# Patient Record
Sex: FEMALE
[De-identification: names, ages, dates, MRNs, and addresses within clinical notes are randomized per-mention and may not be internally consistent; named-entity substitution may affect disease eponyms.]

---

## 2020-02-17 ENCOUNTER — HOSPITAL ENCOUNTER (OUTPATIENT)
Dept: HOSPITAL 90 - OIH | Age: 75
Discharge: HOME | End: 2020-02-17
Attending: INTERNAL MEDICINE
Payer: COMMERCIAL

## 2020-02-17 DIAGNOSIS — I10: Primary | ICD-10-CM

## 2020-02-17 PROCEDURE — 71045 X-RAY EXAM CHEST 1 VIEW: CPT

## 2022-06-27 ENCOUNTER — HOSPITAL ENCOUNTER (OUTPATIENT)
Dept: HOSPITAL 90 - RAH | Age: 77
Discharge: HOME | End: 2022-06-27
Attending: INTERNAL MEDICINE
Payer: COMMERCIAL

## 2022-06-27 DIAGNOSIS — S52.502A: Primary | ICD-10-CM
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
## 2022-06-27 PROCEDURE — 73100 X-RAY EXAM OF WRIST: CPT

## 2022-12-07 ENCOUNTER — HOSPITAL ENCOUNTER (OUTPATIENT)
Dept: HOSPITAL 90 - RAH | Age: 77
Discharge: HOME | End: 2022-12-07
Attending: INTERNAL MEDICINE
Payer: COMMERCIAL

## 2022-12-07 DIAGNOSIS — R13.10: ICD-10-CM

## 2022-12-07 DIAGNOSIS — J69.0: ICD-10-CM

## 2022-12-07 DIAGNOSIS — R13.12: Primary | ICD-10-CM

## 2022-12-07 PROCEDURE — 74230 X-RAY XM SWLNG FUNCJ C+: CPT

## 2022-12-07 PROCEDURE — 92611 MOTION FLUOROSCOPY/SWALLOW: CPT

## 2024-12-02 ENCOUNTER — HOSPITAL ENCOUNTER (OUTPATIENT)
Dept: HOSPITAL 90 - RAH | Age: 79
Discharge: HOME | End: 2024-12-02
Attending: INTERNAL MEDICINE
Payer: COMMERCIAL

## 2024-12-02 DIAGNOSIS — M41.85: ICD-10-CM

## 2024-12-02 DIAGNOSIS — M54.50: ICD-10-CM

## 2024-12-02 DIAGNOSIS — M47.815: Primary | ICD-10-CM

## 2024-12-02 DIAGNOSIS — J98.11: ICD-10-CM

## 2024-12-02 PROCEDURE — 71046 X-RAY EXAM CHEST 2 VIEWS: CPT

## 2024-12-02 PROCEDURE — 72080 X-RAY EXAM THORACOLMB 2/> VW: CPT

## 2024-12-02 NOTE — [facility]
THORACOLUMBAR 2VW



REASON:  BACK PAIN, SCOLIOSIS, SUSPECT VERTEBRAL FX.



COMPARISON: None 



TECHNIQUE: 2 images of the thoracolumbar spine were obtained. 



FINDINGS: There is dextroscoliosis of thoracolumbar spine with scoliotic

angle of 18 degrees. No loss of vertebral height is seen.



IMPRESSION: 

  Scoliosis.

## 2024-12-02 NOTE — [facility]
CHEST 2VWS



HISTORY: Back pain



COMPARISON: 2/17/2020



FINDINGS: Frontal and lateral projections of the chest were obtained.

There is no acute pulmonary infiltrates or failure. The heart is not

enlarged. Basilar linear atelectasis changes are seen. Tortuosity of the

aorta is seen. No evidence of aortic calcification is seen. 

Degenerative changes are seen of the thoracolumbar spine.



IMPRESSION:

1. No acute pulmonary infiltrates.